# Patient Record
Sex: FEMALE | Race: WHITE
[De-identification: names, ages, dates, MRNs, and addresses within clinical notes are randomized per-mention and may not be internally consistent; named-entity substitution may affect disease eponyms.]

---

## 2017-02-10 ENCOUNTER — APPOINTMENT (OUTPATIENT)
Dept: OBGYN | Facility: CLINIC | Age: 67
End: 2017-02-10

## 2017-02-10 VITALS — DIASTOLIC BLOOD PRESSURE: 80 MMHG | SYSTOLIC BLOOD PRESSURE: 130 MMHG | HEIGHT: 61 IN

## 2017-02-10 DIAGNOSIS — Z80.3 FAMILY HISTORY OF MALIGNANT NEOPLASM OF BREAST: ICD-10-CM

## 2017-02-10 DIAGNOSIS — Z12.4 ENCOUNTER FOR SCREENING FOR MALIGNANT NEOPLASM OF CERVIX: ICD-10-CM

## 2017-02-10 DIAGNOSIS — Z78.9 OTHER SPECIFIED HEALTH STATUS: ICD-10-CM

## 2017-02-10 DIAGNOSIS — Z92.89 PERSONAL HISTORY OF OTHER MEDICAL TREATMENT: ICD-10-CM

## 2017-02-16 LAB
CYTOLOGY CVX/VAG DOC THIN PREP: NORMAL
HPV HIGH+LOW RISK DNA PNL CVX: POSITIVE

## 2017-03-06 DIAGNOSIS — R92.8 OTHER ABNORMAL AND INCONCLUSIVE FINDINGS ON DIAGNOSTIC IMAGING OF BREAST: ICD-10-CM

## 2017-06-05 ENCOUNTER — OTHER (OUTPATIENT)
Age: 67
End: 2017-06-05

## 2018-02-16 ENCOUNTER — APPOINTMENT (OUTPATIENT)
Dept: OBGYN | Facility: CLINIC | Age: 68
End: 2018-02-16
Payer: MEDICARE

## 2018-02-16 VITALS
HEIGHT: 61 IN | BODY MASS INDEX: 24.35 KG/M2 | DIASTOLIC BLOOD PRESSURE: 80 MMHG | WEIGHT: 129 LBS | SYSTOLIC BLOOD PRESSURE: 110 MMHG

## 2018-02-16 DIAGNOSIS — Z86.19 PERSONAL HISTORY OF OTHER INFECTIOUS AND PARASITIC DISEASES: ICD-10-CM

## 2018-02-16 DIAGNOSIS — B97.7 PAPILLOMAVIRUS AS THE CAUSE OF DISEASES CLASSIFIED ELSEWHERE: ICD-10-CM

## 2018-02-16 PROCEDURE — 99214 OFFICE O/P EST MOD 30 MIN: CPT

## 2018-02-16 RX ORDER — AMOXICILLIN 500 MG/1
500 CAPSULE ORAL
Qty: 30 | Refills: 0 | Status: COMPLETED | COMMUNITY
Start: 2016-11-04

## 2018-02-22 LAB
CYTOLOGY CVX/VAG DOC THIN PREP: NORMAL
HPV HIGH+LOW RISK DNA PNL CVX: NOT DETECTED

## 2019-05-10 ENCOUNTER — APPOINTMENT (OUTPATIENT)
Dept: OBGYN | Facility: CLINIC | Age: 69
End: 2019-05-10
Payer: MEDICARE

## 2019-05-10 VITALS — SYSTOLIC BLOOD PRESSURE: 121 MMHG | DIASTOLIC BLOOD PRESSURE: 80 MMHG

## 2019-05-10 PROCEDURE — G0101: CPT

## 2019-05-10 NOTE — HISTORY OF PRESENT ILLNESS
[1 Year Ago] : 1 year ago [Good] : being in good health [Regular Exercise] : regular exercise [Healthy Diet] : a healthy diet [Last Mammogram ___] : Last Mammogram was [unfilled] [Last Pap ___] : Last cervical pap smear was [unfilled] [Postmenopausal] : is postmenopausal [Last Bone Density ___] : Last bone density studies [unfilled] [Last Colonoscopy ___] : Last colonoscopy [unfilled] [Sexually Active] : is sexually active

## 2019-05-10 NOTE — COUNSELING
allergic rx to chinese rice [Breast Self Exam] : breast self exam [Nutrition] : nutrition [Vitamins/Supplements] : vitamins/supplements [Exercise] : exercise

## 2019-05-10 NOTE — CHIEF COMPLAINT
[Annual Visit] : annual visit [FreeTextEntry1] : Finishing up book tour, no vb, taking vit d\par had normal pap hpv+ 2 yrs ago, last yr nml hpv-

## 2019-05-10 NOTE — PHYSICAL EXAM
[Awake] : awake [Alert] : alert [LAD] : no lymphadenopathy [Acute Distress] : no acute distress [Thyroid Nodule] : no thyroid nodule [Goiter] : no goiter [Mass] : no breast mass [Nipple Discharge] : no nipple discharge [Axillary LAD] : no axillary lymphadenopathy [Soft] : soft [Tender] : non tender [Distended] : not distended [H/Smegaly] : no hepatosplenomegaly [Oriented x3] : oriented to person, place, and time [Depressed Mood] : not depressed [Flat Affect] : affect not flat [Vulvar Atrophy] : vulvar atrophy [Normal] : uterus [Pap Obtained] : a Pap smear was performed [No Bleeding] : there was no active vaginal bleeding [Atrophy] : atrophy [Normal Position] : in a normal position [Tenderness] : nontender [Enlarged ___ wks] : not enlarged [Uterine Adnexae] : were not tender and not enlarged [Adnexa Tenderness] : were not tender [Mass ___ cm] : no uterine mass was palpated [Ovarian Mass (___ Cm)] : there were no adnexal masses [RRR, No Murmurs] : RRR, no murmurs [Occult Blood] : occult blood test from digital rectal exam was negative [No Tenderness] : no rectal tenderness [CTAB] : CTAB

## 2019-05-13 LAB — HPV HIGH+LOW RISK DNA PNL CVX: NOT DETECTED

## 2019-05-16 LAB — CYTOLOGY CVX/VAG DOC THIN PREP: NORMAL

## 2020-09-11 ENCOUNTER — APPOINTMENT (OUTPATIENT)
Dept: OBGYN | Facility: CLINIC | Age: 70
End: 2020-09-11
Payer: MEDICARE

## 2020-09-11 VITALS
HEIGHT: 61 IN | WEIGHT: 132 LBS | SYSTOLIC BLOOD PRESSURE: 140 MMHG | DIASTOLIC BLOOD PRESSURE: 90 MMHG | BODY MASS INDEX: 24.92 KG/M2 | TEMPERATURE: 97.7 F

## 2020-09-11 PROCEDURE — G0101: CPT

## 2020-09-11 NOTE — CHIEF COMPLAINT
[Annual Visit] : annual visit [FreeTextEntry1] : 71 yo G0 with LMP 50 for annual exam. Patient of Dr Kidd for this year's exam.

## 2020-09-16 LAB — HPV HIGH+LOW RISK DNA PNL CVX: NOT DETECTED

## 2021-09-02 DIAGNOSIS — Z98.890 OTHER SPECIFIED POSTPROCEDURAL STATES: ICD-10-CM

## 2021-09-10 PROBLEM — Z98.890 HISTORY OF COLONOSCOPY: Status: RESOLVED | Noted: 2017-02-10 | Resolved: 2021-09-10

## 2022-03-04 ENCOUNTER — APPOINTMENT (OUTPATIENT)
Dept: OBGYN | Facility: CLINIC | Age: 72
End: 2022-03-04
Payer: MEDICARE

## 2022-03-04 VITALS
HEIGHT: 61 IN | DIASTOLIC BLOOD PRESSURE: 80 MMHG | WEIGHT: 140 LBS | TEMPERATURE: 98 F | SYSTOLIC BLOOD PRESSURE: 130 MMHG | BODY MASS INDEX: 26.43 KG/M2

## 2022-03-04 PROCEDURE — G0101: CPT

## 2022-03-04 NOTE — DISCUSSION/SUMMARY
[FreeTextEntry1] : I explained that I cant  her on safety or efficacy of above supplements because of limited studies. She understands.

## 2022-03-04 NOTE — HISTORY OF PRESENT ILLNESS
[No] : Patient does not have concerns regarding sex [Currently Active] : currently active [Men] : men [Vaginal] : vaginal [TextBox_4] : requests pap again\par doesn’t get bmd's because she doesn’t want to take medicine\par takes vit d and exercises\par taking DHEA, pregnanalone and progesterone cream,  is a  and into nutritional supplements. [Mammogramdate] : 9/2020 [PapSmeardate] : 9/2020 [BoneDensityDate] : declines [ColonoscopyDate] : 9/2021 [TextBox_43] : fu 3 yrs

## 2022-03-04 NOTE — PHYSICAL EXAM
[Appropriately responsive] : appropriately responsive [Alert] : alert [No Acute Distress] : no acute distress [Soft] : soft [Non-tender] : non-tender [Non-distended] : non-distended [No HSM] : No HSM [No Lesions] : no lesions [No Mass] : no mass [Oriented x3] : oriented x3 [Examination Of The Breasts] : a normal appearance [No Masses] : no breast masses were palpable [Vulvar Atrophy] : vulvar atrophy [Labia Majora] : normal [Labia Minora] : normal [Atrophy] : atrophy [Normal] : normal [Tenderness] : nontender [Enlarged ___ wks] : not enlarged [Mass ___ cm] : no uterine mass was palpated [Uterine Adnexae] : non-palpable [FreeTextEntry5] : pap done [FreeTextEntry9] : deferred secondary to recent colonoscopy

## 2022-03-06 LAB — HPV HIGH+LOW RISK DNA PNL CVX: NOT DETECTED

## 2022-07-20 ENCOUNTER — FORM ENCOUNTER (OUTPATIENT)
Age: 72
End: 2022-07-20

## 2022-07-22 ENCOUNTER — APPOINTMENT (OUTPATIENT)
Dept: FAMILY MEDICINE | Facility: CLINIC | Age: 72
End: 2022-07-22

## 2022-07-22 VITALS
DIASTOLIC BLOOD PRESSURE: 88 MMHG | SYSTOLIC BLOOD PRESSURE: 142 MMHG | HEIGHT: 61 IN | TEMPERATURE: 97.3 F | BODY MASS INDEX: 25.68 KG/M2 | OXYGEN SATURATION: 98 % | HEART RATE: 90 BPM | WEIGHT: 136 LBS

## 2022-07-22 VITALS — DIASTOLIC BLOOD PRESSURE: 84 MMHG | SYSTOLIC BLOOD PRESSURE: 130 MMHG

## 2022-07-22 PROCEDURE — G0439: CPT

## 2022-07-22 NOTE — PHYSICAL EXAM
[No Acute Distress] : no acute distress [Well Nourished] : well nourished [Well-Appearing] : well-appearing [Well Developed] : well developed [Normal Sclera/Conjunctiva] : normal sclera/conjunctiva [PERRL] : pupils equal round and reactive to light [EOMI] : extraocular movements intact [Normal Outer Ear/Nose] : the outer ears and nose were normal in appearance [Normal Oropharynx] : the oropharynx was normal [No JVD] : no jugular venous distention [No Lymphadenopathy] : no lymphadenopathy [Supple] : supple [Thyroid Normal, No Nodules] : the thyroid was normal and there were no nodules present [No Respiratory Distress] : no respiratory distress  [No Accessory Muscle Use] : no accessory muscle use [Clear to Auscultation] : lungs were clear to auscultation bilaterally [Normal Rate] : normal rate  [Regular Rhythm] : with a regular rhythm [Normal S1, S2] : normal S1 and S2 [No Murmur] : no murmur heard [No Carotid Bruits] : no carotid bruits [No Abdominal Bruit] : a ~M bruit was not heard ~T in the abdomen [No Varicosities] : no varicosities [Pedal Pulses Present] : the pedal pulses are present [No Edema] : there was no peripheral edema [No Palpable Aorta] : no palpable aorta [No Extremity Clubbing/Cyanosis] : no extremity clubbing/cyanosis [Soft] : abdomen soft [Non Tender] : non-tender [Non-distended] : non-distended [No Masses] : no abdominal mass palpated [No HSM] : no HSM [Normal Bowel Sounds] : normal bowel sounds [Normal Posterior Cervical Nodes] : no posterior cervical lymphadenopathy [Normal Anterior Cervical Nodes] : no anterior cervical lymphadenopathy [No CVA Tenderness] : no CVA  tenderness [No Spinal Tenderness] : no spinal tenderness [No Joint Swelling] : no joint swelling [Grossly Normal Strength/Tone] : grossly normal strength/tone [No Rash] : no rash [Coordination Grossly Intact] : coordination grossly intact [No Focal Deficits] : no focal deficits [Normal Gait] : normal gait [Normal Affect] : the affect was normal [Deep Tendon Reflexes (DTR)] : deep tendon reflexes were 2+ and symmetric [Normal Insight/Judgement] : insight and judgment were intact

## 2022-07-22 NOTE — PLAN
[FreeTextEntry1] : Reviewed age-appropriate preventative screening tests with patient. She is due for DEXA and several vaccines (Shingrix, Tdap, Pneumococcal). She declines all of these.\par \par Discussed clean eating (e.g. Mediterranean style diet) and recommendations for regular exercise/ staying as physically active as possible.\par \par Reviewed importance of good self care (e.g. meditation, yoga, adequate rest, regular exercise, magnesium, clean eating, etc.).\par \par Follow up for next physical in one year.\par

## 2022-07-22 NOTE — HISTORY OF PRESENT ILLNESS
[FreeTextEntry1] : HARISH QUINONES is a 72 year old female here for a physical exam.  [de-identified] : Her last physical exam was 11/13/2020\par \par Vaccines:\par Tetanus is NOT up to date\par Pneumococcal vaccine is NOT up to date\par Shingrix is NOT up to date\par COVID vaccine is NOT up to date \par \par Her last dentist visit was less than one year ago\par Her last eye doctor appointment was less than one year ago\par Her last dermatologist visit was a couple years ago\par \par GYN visit is up to date\par Mammogram is up to date\par Colon cancer screening is up to date\par DEXA is NOT up to date\par \par Her diet is healthy overall\par Exercise: treadmill

## 2022-07-22 NOTE — ASSESSMENT
[FreeTextEntry1] : HARISH QUINONES is a 72 year old female here for a physical exam. \par \par She has a history of white coat hypertension.\par \par She sees a cardiologist regularly and does not need an EKG today. Her cardiologist also ordered labs so she will not have blood work done today.

## 2023-01-28 ENCOUNTER — OFFICE (OUTPATIENT)
Dept: URBAN - METROPOLITAN AREA CLINIC 94 | Facility: CLINIC | Age: 73
Setting detail: OPHTHALMOLOGY
End: 2023-01-28
Payer: MEDICARE

## 2023-01-28 DIAGNOSIS — H43.813: ICD-10-CM

## 2023-01-28 DIAGNOSIS — H35.371: ICD-10-CM

## 2023-01-28 DIAGNOSIS — H35.411: ICD-10-CM

## 2023-01-28 DIAGNOSIS — D31.32: ICD-10-CM

## 2023-01-28 DIAGNOSIS — H35.3132: ICD-10-CM

## 2023-01-28 PROBLEM — H43.392 VITREOUS FLOATERS; RIGHT EYE, LEFT EYE: Status: ACTIVE | Noted: 2023-01-28

## 2023-01-28 PROBLEM — H43.391 VITREOUS FLOATERS; RIGHT EYE, LEFT EYE: Status: ACTIVE | Noted: 2023-01-28

## 2023-01-28 PROCEDURE — 92014 COMPRE OPH EXAM EST PT 1/>: CPT | Performed by: OPHTHALMOLOGY

## 2023-01-28 PROCEDURE — 92134 CPTRZ OPH DX IMG PST SGM RTA: CPT | Performed by: OPHTHALMOLOGY

## 2023-01-28 PROCEDURE — 92235 FLUORESCEIN ANGRPH MLTIFRAME: CPT | Performed by: OPHTHALMOLOGY

## 2023-01-28 ASSESSMENT — LID EXAM ASSESSMENTS
OD_BLEPHARITIS: 2+
OD_LEVATOR_FUNCTION: 20 MM
OD_MRD1: 1.5 MM
OS_MRD1: 1.5 MM
OS_LEVATOR_FUNCTION: 20 MM
OD_FRONTALIS_USE: 1+
OS_BLEPHARITIS: 2+

## 2023-01-28 ASSESSMENT — REFRACTION_AUTOREFRACTION
OD_SPHERE: -2.25
OS_AXIS: 5
OD_AXIS: 1
OS_SPHERE: -2.50
OD_CYLINDER: -4.75
OS_CYLINDER: -3.50

## 2023-01-28 ASSESSMENT — CONFRONTATIONAL VISUAL FIELD TEST (CVF)
OS_FINDINGS: FULL
OD_FINDINGS: FULL

## 2023-01-28 ASSESSMENT — KERATOMETRY
OS_K1POWER_DIOPTERS: 44.50
OD_K1POWER_DIOPTERS: 44.00
OS_AXISANGLE_DEGREES: 99
OD_AXISANGLE_DEGREES: 87
OS_K2POWER_DIOPTERS: 47.25
METHOD_AUTO_MANUAL: AUTO
OD_K2POWER_DIOPTERS: 48.25

## 2023-01-28 ASSESSMENT — SPHEQUIV_DERIVED
OS_SPHEQUIV: -4.25
OD_SPHEQUIV: -4.625

## 2023-01-28 ASSESSMENT — LID POSITION - DERMATOCHALASIS
OS_DERMATOCHALASIS: T
OD_DERMATOCHALASIS: T

## 2023-01-28 ASSESSMENT — LID POSITION - PTOSIS
OD_PTOSIS: RUL
OS_PTOSIS: LUL

## 2023-01-28 ASSESSMENT — AXIALLENGTH_DERIVED
OS_AL: 24.4023
OD_AL: 24.4605

## 2023-01-28 ASSESSMENT — VISUAL ACUITY
OD_BCVA: 20/25
OS_BCVA: 20/25

## 2023-01-28 ASSESSMENT — TONOMETRY
OS_IOP_MMHG: 18
OD_IOP_MMHG: 17

## 2023-03-17 DIAGNOSIS — R73.09 OTHER ABNORMAL GLUCOSE: ICD-10-CM

## 2023-03-21 ENCOUNTER — TRANSCRIPTION ENCOUNTER (OUTPATIENT)
Age: 73
End: 2023-03-21

## 2023-03-22 ENCOUNTER — NON-APPOINTMENT (OUTPATIENT)
Age: 73
End: 2023-03-22

## 2023-03-24 ENCOUNTER — APPOINTMENT (OUTPATIENT)
Dept: FAMILY MEDICINE | Facility: CLINIC | Age: 73
End: 2023-03-24
Payer: MEDICARE

## 2023-03-24 PROCEDURE — 36415 COLL VENOUS BLD VENIPUNCTURE: CPT

## 2023-03-25 LAB
25(OH)D3 SERPL-MCNC: 110 NG/ML
ALBUMIN SERPL ELPH-MCNC: 4.6 G/DL
ALP BLD-CCNC: 83 U/L
ALT SERPL-CCNC: 20 U/L
ANION GAP SERPL CALC-SCNC: 11 MMOL/L
AST SERPL-CCNC: 18 U/L
BASOPHILS # BLD AUTO: 0.08 K/UL
BASOPHILS NFR BLD AUTO: 0.9 %
BILIRUB SERPL-MCNC: 0.3 MG/DL
BUN SERPL-MCNC: 17 MG/DL
CALCIUM SERPL-MCNC: 9.7 MG/DL
CHLORIDE SERPL-SCNC: 106 MMOL/L
CHOLEST SERPL-MCNC: 206 MG/DL
CO2 SERPL-SCNC: 25 MMOL/L
COVID-19 SPIKE DOMAIN ANTIBODY INTERPRETATION: NEGATIVE
CREAT SERPL-MCNC: 0.99 MG/DL
EGFR: 61 ML/MIN/1.73M2
EOSINOPHIL # BLD AUTO: 0.14 K/UL
EOSINOPHIL NFR BLD AUTO: 1.5 %
ESTIMATED AVERAGE GLUCOSE: 117 MG/DL
GLUCOSE SERPL-MCNC: 102 MG/DL
HBA1C MFR BLD HPLC: 5.7 %
HCT VFR BLD CALC: 45.7 %
HDLC SERPL-MCNC: 64 MG/DL
HGB BLD-MCNC: 14.4 G/DL
IMM GRANULOCYTES NFR BLD AUTO: 0.4 %
LDLC SERPL CALC-MCNC: 126 MG/DL
LYMPHOCYTES # BLD AUTO: 1.89 K/UL
LYMPHOCYTES NFR BLD AUTO: 20.1 %
MAN DIFF?: NORMAL
MCHC RBC-ENTMCNC: 31.2 PG
MCHC RBC-ENTMCNC: 31.5 GM/DL
MCV RBC AUTO: 99.1 FL
MONOCYTES # BLD AUTO: 0.55 K/UL
MONOCYTES NFR BLD AUTO: 5.9 %
NEUTROPHILS # BLD AUTO: 6.68 K/UL
NEUTROPHILS NFR BLD AUTO: 71.2 %
NONHDLC SERPL-MCNC: 142 MG/DL
PLATELET # BLD AUTO: 278 K/UL
POTASSIUM SERPL-SCNC: 4.1 MMOL/L
PROT SERPL-MCNC: 6.9 G/DL
RBC # BLD: 4.61 M/UL
RBC # FLD: 13.2 %
SARS-COV-2 AB SERPL IA-ACNC: 0.4 U/ML
SODIUM SERPL-SCNC: 143 MMOL/L
TRIGL SERPL-MCNC: 77 MG/DL
TSH SERPL-ACNC: 4.14 UIU/ML
WBC # FLD AUTO: 9.38 K/UL

## 2023-03-27 LAB
GLIADIN IGA SER QL: <5 UNITS
GLIADIN IGG SER QL: <5 UNITS
GLIADIN PEPTIDE IGA SER-ACNC: NEGATIVE
GLIADIN PEPTIDE IGG SER-ACNC: NEGATIVE
TTG IGA SER IA-ACNC: <1.2 U/ML
TTG IGA SER-ACNC: NEGATIVE
TTG IGG SER IA-ACNC: 2 U/ML
TTG IGG SER IA-ACNC: NEGATIVE

## 2023-03-28 ENCOUNTER — NON-APPOINTMENT (OUTPATIENT)
Age: 73
End: 2023-03-28

## 2023-03-29 LAB
ENDOMYSIUM IGA SER QL: NEGATIVE
ENDOMYSIUM IGA TITR SER: NORMAL

## 2023-04-15 ENCOUNTER — OFFICE (OUTPATIENT)
Dept: URBAN - METROPOLITAN AREA CLINIC 12 | Facility: CLINIC | Age: 73
Setting detail: OPHTHALMOLOGY
End: 2023-04-15
Payer: MEDICARE

## 2023-04-15 DIAGNOSIS — H35.371: ICD-10-CM

## 2023-04-15 DIAGNOSIS — H43.392: ICD-10-CM

## 2023-04-15 DIAGNOSIS — D31.32: ICD-10-CM

## 2023-04-15 DIAGNOSIS — H16.223: ICD-10-CM

## 2023-04-15 DIAGNOSIS — H35.3132: ICD-10-CM

## 2023-04-15 DIAGNOSIS — H43.391: ICD-10-CM

## 2023-04-15 DIAGNOSIS — H43.813: ICD-10-CM

## 2023-04-15 DIAGNOSIS — H35.411: ICD-10-CM

## 2023-04-15 DIAGNOSIS — H25.13: ICD-10-CM

## 2023-04-15 PROCEDURE — 99214 OFFICE O/P EST MOD 30 MIN: CPT | Performed by: OPHTHALMOLOGY

## 2023-04-15 PROCEDURE — 92250 FUNDUS PHOTOGRAPHY W/I&R: CPT | Performed by: OPHTHALMOLOGY

## 2023-04-15 ASSESSMENT — AXIALLENGTH_DERIVED
OS_AL: 24.4605
OS_AL: 24.4605
OD_AL: 24.4575
OD_AL: 24.4575

## 2023-04-15 ASSESSMENT — REFRACTION_CURRENTRX
OS_VPRISM_DIRECTION: PROGS
OS_SPHERE: -2.50
OS_CYLINDER: +3.50
OD_SPHERE: -2.75
OD_ADD: +3.00
OS_OVR_VA: 20/
OS_AXIS: 005
OD_CYLINDER: -4.50
OS_ADD: +3.00
OD_VPRISM_DIRECTION: PROGS
OD_AXIS: 168
OD_OVR_VA: 20/

## 2023-04-15 ASSESSMENT — TONOMETRY
OS_IOP_MMHG: 18
OD_IOP_MMHG: 17

## 2023-04-15 ASSESSMENT — KERATOMETRY
METHOD_AUTO_MANUAL: AUTO
OS_K2POWER_DIOPTERS: 47.75
OD_K2POWER_DIOPTERS: 48.00
OD_K1POWER_DIOPTERS: 44.00
OS_K1POWER_DIOPTERS: 44.50
OS_AXISANGLE_DEGREES: 094
OD_AXISANGLE_DEGREES: 082

## 2023-04-15 ASSESSMENT — REFRACTION_AUTOREFRACTION
OD_CYLINDER: -4.50
OD_SPHERE: -2.25
OD_AXIS: 177
OS_CYLINDER: -4.25
OS_SPHERE: -2.50
OS_AXIS: 178

## 2023-04-15 ASSESSMENT — LID POSITION - PTOSIS
OD_PTOSIS: RUL
OS_PTOSIS: LUL

## 2023-04-15 ASSESSMENT — LID EXAM ASSESSMENTS
OD_LEVATOR_FUNCTION: 20 MM
OD_MRD1: 1.5 MM
OD_BLEPHARITIS: 2+
OD_FRONTALIS_USE: 1+
OS_MRD1: 1.5 MM
OS_LEVATOR_FUNCTION: 20 MM
OS_BLEPHARITIS: 2+

## 2023-04-15 ASSESSMENT — REFRACTION_MANIFEST
OS_SPHERE: -2.50
OS_CYLINDER: -4.25
OS_AXIS: 180
OD_AXIS: 180
OD_SPHERE: -2.25
OD_CYLINDER: -4.50

## 2023-04-15 ASSESSMENT — CONFRONTATIONAL VISUAL FIELD TEST (CVF)
OD_FINDINGS: FULL
OS_FINDINGS: FULL

## 2023-04-15 ASSESSMENT — SPHEQUIV_DERIVED
OD_SPHEQUIV: -4.5
OS_SPHEQUIV: -4.625
OD_SPHEQUIV: -4.5
OS_SPHEQUIV: -4.625

## 2023-04-15 ASSESSMENT — LID POSITION - DERMATOCHALASIS
OS_DERMATOCHALASIS: T
OD_DERMATOCHALASIS: T

## 2023-04-15 ASSESSMENT — VISUAL ACUITY
OD_BCVA: 20/25+2
OS_BCVA: 20/30+1

## 2023-04-21 ENCOUNTER — NON-APPOINTMENT (OUTPATIENT)
Age: 73
End: 2023-04-21

## 2023-04-22 ENCOUNTER — APPOINTMENT (OUTPATIENT)
Dept: OBGYN | Facility: CLINIC | Age: 73
End: 2023-04-22
Payer: MEDICARE

## 2023-04-22 VITALS
BODY MASS INDEX: 23.98 KG/M2 | HEIGHT: 61 IN | DIASTOLIC BLOOD PRESSURE: 80 MMHG | SYSTOLIC BLOOD PRESSURE: 130 MMHG | WEIGHT: 127 LBS

## 2023-04-22 DIAGNOSIS — Z01.419 ENCOUNTER FOR GYNECOLOGICAL EXAMINATION (GENERAL) (ROUTINE) W/OUT ABNORMAL FINDINGS: ICD-10-CM

## 2023-04-22 DIAGNOSIS — Z12.31 ENCOUNTER FOR SCREENING MAMMOGRAM FOR MALIGNANT NEOPLASM OF BREAST: ICD-10-CM

## 2023-04-22 PROCEDURE — 99214 OFFICE O/P EST MOD 30 MIN: CPT | Mod: 25

## 2023-04-22 RX ORDER — BLOOD SUGAR DIAGNOSTIC
100 STRIP MISCELLANEOUS
Refills: 0 | Status: ACTIVE | COMMUNITY

## 2023-04-22 RX ORDER — UBIDECARENONE/VIT E ACET 100MG-5
CAPSULE ORAL
Refills: 0 | Status: ACTIVE | COMMUNITY

## 2023-04-22 RX ORDER — MULTIVITAMIN,THER AND MINERALS
TABLET ORAL
Refills: 0 | Status: ACTIVE | COMMUNITY

## 2023-04-22 RX ORDER — AMOXICILLIN 500 MG/1
500 TABLET, FILM COATED ORAL
Qty: 20 | Refills: 3 | Status: DISCONTINUED | COMMUNITY
Start: 2022-07-22 | End: 2023-04-22

## 2023-04-22 RX ORDER — ACETAMINOPHEN 500 MG
TABLET ORAL
Refills: 0 | Status: ACTIVE | COMMUNITY

## 2023-04-22 RX ORDER — ZINC SULFATE 50(220)MG
CAPSULE ORAL
Refills: 0 | Status: ACTIVE | COMMUNITY

## 2023-04-22 NOTE — HISTORY OF PRESENT ILLNESS
[No] : Patient does not have concerns regarding sex [Currently Active] : currently active [Men] : men [Vaginal] : vaginal [TextBox_4] : requests pap again\par doesn’t get bmd's because she doesn’t want to take medicine\par takes vit d and exercises\par taking DHEA, pregnanalone and progesterone cream,  is a  and into nutritional supplements.\par digestive problems and stomach gets big, did a cleanse and feels better but want to make sure nothing else is going on. [Mammogramdate] : 3/2022 [PapSmeardate] : 3/2022 [ColonoscopyDate] : 9/2021 [BoneDensityDate] : declines [TextBox_43] : fu 3 yrs

## 2023-04-22 NOTE — PHYSICAL EXAM
[Appropriately responsive] : appropriately responsive [Alert] : alert [No Acute Distress] : no acute distress [Soft] : soft [Non-tender] : non-tender [Non-distended] : non-distended [No HSM] : No HSM [No Lesions] : no lesions [No Mass] : no mass [Oriented x3] : oriented x3 [Examination Of The Breasts] : a normal appearance [No Masses] : no breast masses were palpable [Labia Majora] : normal [Vulvar Atrophy] : vulvar atrophy [Labia Minora] : normal [Atrophy] : atrophy [Normal] : normal [Uterine Adnexae] : non-palpable [No Tenderness] : no tenderness [Tenderness] : nontender [Enlarged ___ wks] : not enlarged [Mass ___ cm] : no uterine mass was palpated [FreeTextEntry5] : pap done [FreeTextEntry9] : guaiac-

## 2023-04-24 ENCOUNTER — APPOINTMENT (OUTPATIENT)
Dept: FAMILY MEDICINE | Facility: CLINIC | Age: 73
End: 2023-04-24
Payer: MEDICARE

## 2023-04-24 VITALS
SYSTOLIC BLOOD PRESSURE: 158 MMHG | HEART RATE: 91 BPM | TEMPERATURE: 96 F | DIASTOLIC BLOOD PRESSURE: 90 MMHG | HEIGHT: 61 IN | WEIGHT: 127 LBS | BODY MASS INDEX: 23.98 KG/M2 | OXYGEN SATURATION: 98 %

## 2023-04-24 VITALS — DIASTOLIC BLOOD PRESSURE: 90 MMHG | SYSTOLIC BLOOD PRESSURE: 142 MMHG

## 2023-04-24 DIAGNOSIS — H25.89 OTHER AGE-RELATED CATARACT: ICD-10-CM

## 2023-04-24 DIAGNOSIS — Z01.818 ENCOUNTER FOR OTHER PREPROCEDURAL EXAMINATION: ICD-10-CM

## 2023-04-24 LAB — HPV HIGH+LOW RISK DNA PNL CVX: NOT DETECTED

## 2023-04-24 PROCEDURE — 99213 OFFICE O/P EST LOW 20 MIN: CPT

## 2023-04-24 NOTE — ADDENDUM
[FreeTextEntry1] : Agree with above. Patient is medically optimized for the proposed procedure.\par Arjun Manzano M.D.\par

## 2023-04-24 NOTE — ASSESSMENT
[FreeTextEntry4] : Patient is a 74yo female with PMH HTN, prediabetes who presents to the office for presurgical clearance. Patient is scheduled for cataract repair with Dr. Sutton, right eye on 05/08/2023, left eye on 05/22/2023 at [facility]. \par \par Initial BP elevated, repeat improved.\par BP 04/22/2023 130/80 at GYN.\par \par Pt optimized for proposed procedure.

## 2023-04-24 NOTE — HISTORY OF PRESENT ILLNESS
[No Pertinent Cardiac History] : no history of aortic stenosis, atrial fibrillation, coronary artery disease, recent myocardial infarction, or implantable device/pacemaker [No Pertinent Pulmonary History] : no history of asthma, COPD, sleep apnea, or smoking [No Adverse Anesthesia Reaction] : no adverse anesthesia reaction in self or family member [(Patient denies any chest pain, claudication, dyspnea on exertion, orthopnea, palpitations or syncope)] : Patient denies any chest pain, claudication, dyspnea on exertion, orthopnea, palpitations or syncope [Chronic Anticoagulation] : no chronic anticoagulation [Chronic Kidney Disease] : no chronic kidney disease [Diabetes] : no diabetes [FreeTextEntry2] : Right eye 05/08/2023, Left eye 05/22/2023 [FreeTextEntry1] : Cataract repair [FreeTextEntry3] : Dr. Sutton [FreeTextEntry4] : Patient is a 72yo female with PMH HTN, prediabetes who presents to the office for presurgical clearance.  Patient is scheduled for cataract repair with Dr. Sutton, right eye on 05/08/2023, left eye on 05/22/2023 at [facility].  Pt does not require PST.

## 2023-04-25 LAB — CYTOLOGY CVX/VAG DOC THIN PREP: NORMAL

## 2023-04-28 ENCOUNTER — FORM ENCOUNTER (OUTPATIENT)
Age: 73
End: 2023-04-28

## 2023-04-28 ENCOUNTER — OFFICE (OUTPATIENT)
Dept: URBAN - METROPOLITAN AREA CLINIC 12 | Facility: CLINIC | Age: 73
Setting detail: OPHTHALMOLOGY
End: 2023-04-28
Payer: MEDICARE

## 2023-04-28 DIAGNOSIS — H25.11: ICD-10-CM

## 2023-04-28 DIAGNOSIS — H25.13: ICD-10-CM

## 2023-04-28 PROCEDURE — 99213 OFFICE O/P EST LOW 20 MIN: CPT | Performed by: OPHTHALMOLOGY

## 2023-04-28 PROCEDURE — 92136 OPHTHALMIC BIOMETRY: CPT | Performed by: OPHTHALMOLOGY

## 2023-04-28 ASSESSMENT — KERATOMETRY
OS_K2POWER_DIOPTERS: 47.75
OS_AXISANGLE_DEGREES: 091
OD_AXISANGLE_DEGREES: 087
OS_K1POWER_DIOPTERS: 44.25
OD_K2POWER_DIOPTERS: 48.25
OD_K1POWER_DIOPTERS: 44.00
METHOD_AUTO_MANUAL: AUTO

## 2023-04-28 ASSESSMENT — REFRACTION_CURRENTRX
OS_SPHERE: -2.50
OS_VPRISM_DIRECTION: PROGS
OS_OVR_VA: 20/
OS_CYLINDER: +3.50
OS_AXIS: 005
OD_AXIS: 168
OD_SPHERE: -2.75
OD_ADD: +3.00
OS_ADD: +3.00
OD_VPRISM_DIRECTION: PROGS
OD_CYLINDER: -4.50
OD_OVR_VA: 20/

## 2023-04-28 ASSESSMENT — REFRACTION_AUTOREFRACTION
OS_AXIS: 180
OD_CYLINDER: -4.50
OS_SPHERE: -2.50
OS_CYLINDER: -4.25
OD_AXIS: 178
OD_SPHERE: -2.25

## 2023-04-28 ASSESSMENT — REFRACTION_MANIFEST
OS_CYLINDER: -4.25
OS_SPHERE: -2.50
OS_AXIS: 180
OD_CYLINDER: -4.50
OD_AXIS: 180
OD_SPHERE: -2.25

## 2023-04-28 ASSESSMENT — LID POSITION - PTOSIS
OS_PTOSIS: LUL
OD_PTOSIS: RUL

## 2023-04-28 ASSESSMENT — LID POSITION - DERMATOCHALASIS
OD_DERMATOCHALASIS: T
OS_DERMATOCHALASIS: T

## 2023-04-28 ASSESSMENT — SPHEQUIV_DERIVED
OS_SPHEQUIV: -4.625
OD_SPHEQUIV: -4.5
OD_SPHEQUIV: -4.5
OS_SPHEQUIV: -4.625

## 2023-04-28 ASSESSMENT — LID EXAM ASSESSMENTS
OS_LEVATOR_FUNCTION: 20 MM
OD_FRONTALIS_USE: 1+
OS_BLEPHARITIS: 2+
OS_MRD1: 1.5 MM
OD_LEVATOR_FUNCTION: 20 MM
OD_BLEPHARITIS: 2+
OD_MRD1: 1.5 MM

## 2023-04-28 ASSESSMENT — CONFRONTATIONAL VISUAL FIELD TEST (CVF)
OS_FINDINGS: FULL
OD_FINDINGS: FULL

## 2023-04-28 ASSESSMENT — AXIALLENGTH_DERIVED
OD_AL: 24.4082
OD_AL: 24.4082
OS_AL: 24.5099
OS_AL: 24.5099

## 2023-04-28 ASSESSMENT — TONOMETRY
OD_IOP_MMHG: 15
OS_IOP_MMHG: 14

## 2023-04-28 ASSESSMENT — VISUAL ACUITY
OS_BCVA: 20/60
OD_BCVA: 20/40

## 2023-05-08 ENCOUNTER — FORM ENCOUNTER (OUTPATIENT)
Age: 73
End: 2023-05-08

## 2023-05-08 ENCOUNTER — ASC (OUTPATIENT)
Dept: URBAN - METROPOLITAN AREA SURGERY 8 | Facility: SURGERY | Age: 73
Setting detail: OPHTHALMOLOGY
End: 2023-05-08
Payer: MEDICARE

## 2023-05-08 DIAGNOSIS — H52.211: ICD-10-CM

## 2023-05-08 DIAGNOSIS — H25.11: ICD-10-CM

## 2023-05-08 PROCEDURE — FEMTO PRECISION LASER CATARACT SURGERY: Performed by: OPHTHALMOLOGY

## 2023-05-08 PROCEDURE — A9270 NON-COVERED ITEM OR SERVICE: HCPCS | Performed by: OPHTHALMOLOGY

## 2023-05-08 PROCEDURE — V2787 ASTIGMATISM-CORRECT FUNCTION: HCPCS | Performed by: OPHTHALMOLOGY

## 2023-05-08 PROCEDURE — 66984 XCAPSL CTRC RMVL W/O ECP: CPT | Performed by: OPHTHALMOLOGY

## 2023-05-09 ENCOUNTER — OFFICE (OUTPATIENT)
Dept: URBAN - METROPOLITAN AREA CLINIC 12 | Facility: CLINIC | Age: 73
Setting detail: OPHTHALMOLOGY
End: 2023-05-09
Payer: MEDICARE

## 2023-05-09 ENCOUNTER — RX ONLY (RX ONLY)
Age: 73
End: 2023-05-09

## 2023-05-09 DIAGNOSIS — Z96.1: ICD-10-CM

## 2023-05-09 PROCEDURE — 99024 POSTOP FOLLOW-UP VISIT: CPT | Performed by: STUDENT IN AN ORGANIZED HEALTH CARE EDUCATION/TRAINING PROGRAM

## 2023-05-09 ASSESSMENT — REFRACTION_AUTOREFRACTION
OS_SPHERE: -2.75
OD_SPHERE: +0.25
OD_AXIS: 38
OD_CYLINDER: -1.75
OS_CYLINDER: -4.00
OS_AXIS: 3

## 2023-05-09 ASSESSMENT — LID EXAM ASSESSMENTS
OS_BLEPHARITIS: 2+
OD_FRONTALIS_USE: 1+
OS_MRD1: 1.5 MM
OD_LEVATOR_FUNCTION: 20 MM
OD_MRD1: 1.5 MM
OD_BLEPHARITIS: 2+
OS_LEVATOR_FUNCTION: 20 MM

## 2023-05-09 ASSESSMENT — KERATOMETRY
OD_K2POWER_DIOPTERS: 48.00
OD_K1POWER_DIOPTERS: 43.75
OS_K2POWER_DIOPTERS: 47.75
OD_AXISANGLE_DEGREES: 80
OS_AXISANGLE_DEGREES: 97
METHOD_AUTO_MANUAL: AUTO
OS_K1POWER_DIOPTERS: 44.50

## 2023-05-09 ASSESSMENT — REFRACTION_CURRENTRX
OD_OVR_VA: 20/
OD_ADD: +3.00
OD_AXIS: 168
OD_CYLINDER: -4.50
OS_VPRISM_DIRECTION: PROGS
OD_SPHERE: -2.75
OS_SPHERE: -2.50
OS_ADD: +3.00
OS_OVR_VA: 20/
OD_VPRISM_DIRECTION: PROGS
OS_AXIS: 005
OS_CYLINDER: +3.50

## 2023-05-09 ASSESSMENT — REFRACTION_MANIFEST
OS_CYLINDER: -4.25
OS_AXIS: 180
OS_SPHERE: -2.50
OD_AXIS: 180
OD_SPHERE: -2.25
OD_CYLINDER: -4.50

## 2023-05-09 ASSESSMENT — CONFRONTATIONAL VISUAL FIELD TEST (CVF)
OD_FINDINGS: FULL
OS_FINDINGS: FULL

## 2023-05-09 ASSESSMENT — VISUAL ACUITY
OD_BCVA: 20/30
OS_BCVA: 20/30-1

## 2023-05-09 ASSESSMENT — LID POSITION - PTOSIS
OS_PTOSIS: LUL
OD_PTOSIS: RUL

## 2023-05-09 ASSESSMENT — TONOMETRY
OD_IOP_MMHG: 14
OS_IOP_MMHG: 16
OD_IOP_MMHG: 18

## 2023-05-09 ASSESSMENT — SPHEQUIV_DERIVED
OS_SPHEQUIV: -4.75
OD_SPHEQUIV: -4.5
OS_SPHEQUIV: -4.625
OD_SPHEQUIV: -0.625

## 2023-05-09 ASSESSMENT — AXIALLENGTH_DERIVED
OD_AL: 22.9794
OD_AL: 24.5069
OS_AL: 24.5129
OS_AL: 24.4605

## 2023-05-09 ASSESSMENT — LID POSITION - DERMATOCHALASIS
OS_DERMATOCHALASIS: T
OD_DERMATOCHALASIS: T

## 2023-05-19 ENCOUNTER — OFFICE (OUTPATIENT)
Dept: URBAN - METROPOLITAN AREA CLINIC 12 | Facility: CLINIC | Age: 73
Setting detail: OPHTHALMOLOGY
End: 2023-05-19
Payer: MEDICARE

## 2023-05-19 DIAGNOSIS — H25.12: ICD-10-CM

## 2023-05-19 PROCEDURE — 92136 OPHTHALMIC BIOMETRY: CPT | Performed by: OPHTHALMOLOGY

## 2023-05-19 ASSESSMENT — AXIALLENGTH_DERIVED
OS_AL: 24.4605
OD_AL: 24.5069
OD_AL: 22.8873
OS_AL: 24.5129

## 2023-05-19 ASSESSMENT — LID POSITION - DERMATOCHALASIS
OD_DERMATOCHALASIS: T
OS_DERMATOCHALASIS: T

## 2023-05-19 ASSESSMENT — REFRACTION_AUTOREFRACTION
OD_SPHERE: +0.50
OD_CYLINDER: -1.75
OS_AXIS: 177
OD_AXIS: 040
OS_SPHERE: -2.75
OS_CYLINDER: -4.00

## 2023-05-19 ASSESSMENT — LID POSITION - PTOSIS
OD_PTOSIS: RUL
OS_PTOSIS: LUL

## 2023-05-19 ASSESSMENT — REFRACTION_MANIFEST
OS_SPHERE: -2.50
OD_SPHERE: -2.25
OD_CYLINDER: -4.50
OD_AXIS: 180
OS_CYLINDER: -4.25
OS_AXIS: 180

## 2023-05-19 ASSESSMENT — REFRACTION_CURRENTRX
OS_VPRISM_DIRECTION: PROGS
OS_ADD: +3.00
OD_VPRISM_DIRECTION: PROGS
OS_SPHERE: -2.50
OS_AXIS: 005
OD_SPHERE: -2.75
OD_OVR_VA: 20/
OS_OVR_VA: 20/
OD_AXIS: 168
OS_CYLINDER: +3.50
OD_ADD: +3.00
OD_CYLINDER: -4.50

## 2023-05-19 ASSESSMENT — VISUAL ACUITY
OS_BCVA: 20/40
OD_BCVA: 20/50

## 2023-05-19 ASSESSMENT — LID EXAM ASSESSMENTS
OS_LEVATOR_FUNCTION: 20 MM
OD_FRONTALIS_USE: 1+
OD_LEVATOR_FUNCTION: 20 MM
OD_BLEPHARITIS: 2+
OD_MRD1: 1.5 MM
OS_MRD1: 1.5 MM
OS_BLEPHARITIS: 2+

## 2023-05-19 ASSESSMENT — CONFRONTATIONAL VISUAL FIELD TEST (CVF)
OD_FINDINGS: FULL
OS_FINDINGS: FULL

## 2023-05-19 ASSESSMENT — SPHEQUIV_DERIVED
OS_SPHEQUIV: -4.75
OD_SPHEQUIV: -0.375
OD_SPHEQUIV: -4.5
OS_SPHEQUIV: -4.625

## 2023-05-19 ASSESSMENT — KERATOMETRY
METHOD_AUTO_MANUAL: AUTO
OS_AXISANGLE_DEGREES: 091
OS_K2POWER_DIOPTERS: 48.00
OD_AXISANGLE_DEGREES: 084
OD_K2POWER_DIOPTERS: 47.75
OS_K1POWER_DIOPTERS: 44.25
OD_K1POWER_DIOPTERS: 44.00

## 2023-05-19 ASSESSMENT — TONOMETRY
OD_IOP_MMHG: 19
OS_IOP_MMHG: 19

## 2023-05-22 ENCOUNTER — FORM ENCOUNTER (OUTPATIENT)
Age: 73
End: 2023-05-22

## 2023-05-22 ENCOUNTER — ASC (OUTPATIENT)
Dept: URBAN - METROPOLITAN AREA SURGERY 8 | Facility: SURGERY | Age: 73
Setting detail: OPHTHALMOLOGY
End: 2023-05-22
Payer: MEDICARE

## 2023-05-22 DIAGNOSIS — H52.212: ICD-10-CM

## 2023-05-22 DIAGNOSIS — H25.12: ICD-10-CM

## 2023-05-22 PROCEDURE — FEMTO FEMTOSECOND LASER: Performed by: OPHTHALMOLOGY

## 2023-05-22 PROCEDURE — A9270 NON-COVERED ITEM OR SERVICE: HCPCS | Performed by: OPHTHALMOLOGY

## 2023-05-22 PROCEDURE — 66984 XCAPSL CTRC RMVL W/O ECP: CPT | Performed by: OPHTHALMOLOGY

## 2023-05-22 PROCEDURE — V2787 ASTIGMATISM-CORRECT FUNCTION: HCPCS | Performed by: OPHTHALMOLOGY

## 2023-05-23 ENCOUNTER — OFFICE (OUTPATIENT)
Dept: URBAN - METROPOLITAN AREA CLINIC 12 | Facility: CLINIC | Age: 73
Setting detail: OPHTHALMOLOGY
End: 2023-05-23
Payer: MEDICARE

## 2023-05-23 ENCOUNTER — RX ONLY (RX ONLY)
Age: 73
End: 2023-05-23

## 2023-05-23 DIAGNOSIS — Z96.1: ICD-10-CM

## 2023-05-23 PROCEDURE — 99024 POSTOP FOLLOW-UP VISIT: CPT | Performed by: OPTOMETRIST

## 2023-05-23 ASSESSMENT — REFRACTION_CURRENTRX
OS_SPHERE: -2.50
OD_OVR_VA: 20/
OD_VPRISM_DIRECTION: PROGS
OD_CYLINDER: -4.50
OD_SPHERE: -2.75
OS_VPRISM_DIRECTION: PROGS
OS_AXIS: 005
OD_ADD: +3.00
OS_OVR_VA: 20/
OS_CYLINDER: +3.50
OS_ADD: +3.00
OD_AXIS: 168

## 2023-05-23 ASSESSMENT — REFRACTION_MANIFEST
OD_AXIS: 180
OS_AXIS: 180
OS_SPHERE: -2.50
OD_CYLINDER: -4.50
OD_SPHERE: -2.25
OS_CYLINDER: -4.25

## 2023-05-23 ASSESSMENT — REFRACTION_AUTOREFRACTION
OD_CYLINDER: -1.75
OS_AXIS: 121
OS_CYLINDER: -1.25
OD_AXIS: 037
OD_SPHERE: +0.25
OS_SPHERE: -0.25

## 2023-05-23 ASSESSMENT — CONFRONTATIONAL VISUAL FIELD TEST (CVF)
OD_FINDINGS: FULL
OS_FINDINGS: FULL

## 2023-05-23 ASSESSMENT — LID POSITION - PTOSIS
OD_PTOSIS: RUL
OS_PTOSIS: LUL

## 2023-05-23 ASSESSMENT — AXIALLENGTH_DERIVED
OD_AL: 24.4575
OS_AL: 24.8109
OS_AL: 23.2938
OD_AL: 22.9359

## 2023-05-23 ASSESSMENT — LID EXAM ASSESSMENTS
OD_BLEPHARITIS: 2+
OD_MRD1: 1.5 MM
OS_LEVATOR_FUNCTION: 20 MM
OD_LEVATOR_FUNCTION: 20 MM
OS_MRD1: 1.5 MM
OD_FRONTALIS_USE: 1+
OS_BLEPHARITIS: 2+

## 2023-05-23 ASSESSMENT — TONOMETRY
OS_IOP_MMHG: 18
OD_IOP_MMHG: 18

## 2023-05-23 ASSESSMENT — SPHEQUIV_DERIVED
OS_SPHEQUIV: -4.625
OD_SPHEQUIV: -0.625
OS_SPHEQUIV: -0.875
OD_SPHEQUIV: -4.5

## 2023-05-23 ASSESSMENT — KERATOMETRY
OD_K1POWER_DIOPTERS: 44.00
OD_AXISANGLE_DEGREES: 087
OS_K2POWER_DIOPTERS: 47.50
OD_K2POWER_DIOPTERS: 48.00
OS_AXISANGLE_DEGREES: 86
METHOD_AUTO_MANUAL: AUTO
OS_K1POWER_DIOPTERS: 43.00

## 2023-05-23 ASSESSMENT — LID POSITION - DERMATOCHALASIS
OS_DERMATOCHALASIS: T
OD_DERMATOCHALASIS: T

## 2023-05-23 ASSESSMENT — CORNEAL EDEMA CLINICAL DESCRIPTION: OS_CORNEALEDEMA: T

## 2023-05-23 ASSESSMENT — VISUAL ACUITY
OD_BCVA: 20/70-1
OS_BCVA: 20/50-1

## 2023-06-02 ENCOUNTER — OFFICE (OUTPATIENT)
Dept: URBAN - METROPOLITAN AREA CLINIC 12 | Facility: CLINIC | Age: 73
Setting detail: OPHTHALMOLOGY
End: 2023-06-02
Payer: MEDICARE

## 2023-06-02 DIAGNOSIS — H25.13: ICD-10-CM

## 2023-06-02 PROBLEM — Z96.1 PSEUDOPHAKIA- P/O CAT W/ IOL ; BOTH EYES: Status: ACTIVE | Noted: 2023-05-09

## 2023-06-02 PROCEDURE — 99024 POSTOP FOLLOW-UP VISIT: CPT | Performed by: OPHTHALMOLOGY

## 2023-06-02 ASSESSMENT — REFRACTION_CURRENTRX
OS_CYLINDER: +3.50
OS_AXIS: 005
OS_ADD: +3.00
OS_OVR_VA: 20/
OD_AXIS: 168
OD_ADD: +3.00
OS_SPHERE: -2.50
OD_SPHERE: -2.75
OS_VPRISM_DIRECTION: PROGS
OD_VPRISM_DIRECTION: PROGS
OD_OVR_VA: 20/
OD_CYLINDER: -4.50

## 2023-06-02 ASSESSMENT — REFRACTION_MANIFEST
OD_CYLINDER: -4.50
OS_CYLINDER: -4.25
OD_AXIS: 180
OS_AXIS: 180
OD_SPHERE: -2.25
OS_SPHERE: -2.50

## 2023-06-02 ASSESSMENT — LID EXAM ASSESSMENTS
OD_BLEPHARITIS: 2+
OS_MRD1: 1.5 MM
OD_MRD1: 1.5 MM
OS_BLEPHARITIS: 2+
OD_LEVATOR_FUNCTION: 20 MM
OD_FRONTALIS_USE: 1+
OS_LEVATOR_FUNCTION: 20 MM

## 2023-06-02 ASSESSMENT — AXIALLENGTH_DERIVED
OD_AL: 24.4575
OS_AL: 23.0615
OD_AL: 22.89
OS_AL: 24.7603

## 2023-06-02 ASSESSMENT — KERATOMETRY
OD_K1POWER_DIOPTERS: 44.00
OS_K2POWER_DIOPTERS: 47.25
OD_AXISANGLE_DEGREES: 085
OS_AXISANGLE_DEGREES: 084
OS_K1POWER_DIOPTERS: 43.50
OD_K2POWER_DIOPTERS: 48.00
METHOD_AUTO_MANUAL: AUTO

## 2023-06-02 ASSESSMENT — LID POSITION - DERMATOCHALASIS
OS_DERMATOCHALASIS: T
OD_DERMATOCHALASIS: T

## 2023-06-02 ASSESSMENT — CONFRONTATIONAL VISUAL FIELD TEST (CVF)
OS_FINDINGS: FULL
OD_FINDINGS: FULL

## 2023-06-02 ASSESSMENT — TONOMETRY
OS_IOP_MMHG: 15
OD_IOP_MMHG: 15

## 2023-06-02 ASSESSMENT — REFRACTION_AUTOREFRACTION
OD_CYLINDER: -1.50
OS_CYLINDER: -1.25
OS_AXIS: 105
OS_SPHERE: +0.25
OD_AXIS: 040
OD_SPHERE: +0.25

## 2023-06-02 ASSESSMENT — LID POSITION - PTOSIS
OD_PTOSIS: RUL
OS_PTOSIS: LUL

## 2023-06-02 ASSESSMENT — CORNEAL EDEMA CLINICAL DESCRIPTION: OS_CORNEALEDEMA: T

## 2023-06-02 ASSESSMENT — SPHEQUIV_DERIVED
OD_SPHEQUIV: -0.5
OS_SPHEQUIV: -4.625
OD_SPHEQUIV: -4.5
OS_SPHEQUIV: -0.375

## 2023-06-02 ASSESSMENT — VISUAL ACUITY
OS_BCVA: 20/50
OD_BCVA: 20/40

## 2023-06-22 ENCOUNTER — FORM ENCOUNTER (OUTPATIENT)
Age: 73
End: 2023-06-22

## 2023-06-23 ENCOUNTER — OFFICE (OUTPATIENT)
Dept: URBAN - METROPOLITAN AREA CLINIC 12 | Facility: CLINIC | Age: 73
Setting detail: OPHTHALMOLOGY
End: 2023-06-23
Payer: MEDICARE

## 2023-06-23 DIAGNOSIS — H16.223: ICD-10-CM

## 2023-06-23 DIAGNOSIS — H26.491: ICD-10-CM

## 2023-06-23 DIAGNOSIS — Z96.1: ICD-10-CM

## 2023-06-23 PROCEDURE — 99024 POSTOP FOLLOW-UP VISIT: CPT | Performed by: OPHTHALMOLOGY

## 2023-06-23 ASSESSMENT — LID EXAM ASSESSMENTS
OD_MRD1: 1.5 MM
OS_BLEPHARITIS: 2+
OS_LEVATOR_FUNCTION: 20 MM
OD_LEVATOR_FUNCTION: 20 MM
OD_BLEPHARITIS: 2+
OS_MRD1: 1.5 MM
OD_FRONTALIS_USE: 1+

## 2023-06-23 ASSESSMENT — TONOMETRY
OS_IOP_MMHG: 15
OD_IOP_MMHG: 12

## 2023-06-23 ASSESSMENT — LID POSITION - DERMATOCHALASIS
OD_DERMATOCHALASIS: T
OS_DERMATOCHALASIS: T

## 2023-06-23 ASSESSMENT — CONFRONTATIONAL VISUAL FIELD TEST (CVF)
OS_FINDINGS: FULL
OD_FINDINGS: FULL

## 2023-06-23 ASSESSMENT — LID POSITION - PTOSIS
OD_PTOSIS: RUL
OS_PTOSIS: LUL

## 2023-07-06 ASSESSMENT — REFRACTION_MANIFEST
OS_CYLINDER: -4.25
OS_AXIS: 180
OD_AXIS: 180
OS_SPHERE: -2.50
OD_CYLINDER: -4.50
OD_SPHERE: -2.25

## 2023-07-06 ASSESSMENT — REFRACTION_CURRENTRX
OD_SPHERE: -2.75
OS_SPHERE: -2.50
OD_AXIS: 168
OS_AXIS: 005
OS_CYLINDER: +3.50
OD_OVR_VA: 20/
OD_VPRISM_DIRECTION: PROGS
OS_ADD: +3.00
OS_VPRISM_DIRECTION: PROGS
OD_ADD: +3.00
OS_OVR_VA: 20/
OD_CYLINDER: -4.50

## 2023-07-06 ASSESSMENT — REFRACTION_AUTOREFRACTION
OD_CYLINDER: -2.00
OS_SPHERE: PLANO
OS_AXIS: 106
OD_SPHERE: +0.25
OD_AXIS: 037
OS_CYLINDER: -1.00

## 2023-07-06 ASSESSMENT — KERATOMETRY
OD_K1POWER_DIOPTERS: 43.75
OS_K2POWER_DIOPTERS: 47.50
METHOD_AUTO_MANUAL: AUTO
OD_K2POWER_DIOPTERS: 48.50
OS_K1POWER_DIOPTERS: 43.75
OS_AXISANGLE_DEGREES: 084
OD_AXISANGLE_DEGREES: 086

## 2023-07-06 ASSESSMENT — VISUAL ACUITY
OS_BCVA: 20/40
OD_BCVA: 20/40

## 2023-07-06 ASSESSMENT — SPHEQUIV_DERIVED
OS_SPHEQUIV: -4.625
OD_SPHEQUIV: -0.75
OD_SPHEQUIV: -4.5

## 2023-07-06 ASSESSMENT — AXIALLENGTH_DERIVED
OD_AL: 22.9385
OD_AL: 24.4082
OS_AL: 24.6595

## 2023-08-04 ENCOUNTER — APPOINTMENT (OUTPATIENT)
Dept: FAMILY MEDICINE | Facility: CLINIC | Age: 73
End: 2023-08-04
Payer: MEDICARE

## 2023-08-04 VITALS
DIASTOLIC BLOOD PRESSURE: 85 MMHG | OXYGEN SATURATION: 97 % | HEART RATE: 87 BPM | BODY MASS INDEX: 23.6 KG/M2 | WEIGHT: 125 LBS | SYSTOLIC BLOOD PRESSURE: 135 MMHG | HEIGHT: 61 IN | TEMPERATURE: 97.1 F

## 2023-08-04 DIAGNOSIS — Z00.00 ENCOUNTER FOR GENERAL ADULT MEDICAL EXAMINATION W/OUT ABNORMAL FINDINGS: ICD-10-CM

## 2023-08-04 DIAGNOSIS — T88.59XA OTHER COMPLICATIONS OF ANESTHESIA, INITIAL ENCOUNTER: ICD-10-CM

## 2023-08-04 DIAGNOSIS — R14.0 ABDOMINAL DISTENSION (GASEOUS): ICD-10-CM

## 2023-08-04 DIAGNOSIS — K90.41 NON-CELIAC GLUTEN SENSITIVITY: ICD-10-CM

## 2023-08-04 DIAGNOSIS — K90.49 MALABSORPTION DUE TO INTOLERANCE, NOT ELSEWHERE CLASSIFIED: ICD-10-CM

## 2023-08-04 DIAGNOSIS — I10 ESSENTIAL (PRIMARY) HYPERTENSION: ICD-10-CM

## 2023-08-04 PROCEDURE — 36415 COLL VENOUS BLD VENIPUNCTURE: CPT

## 2023-08-04 PROCEDURE — G0439: CPT

## 2023-08-04 PROCEDURE — 99213 OFFICE O/P EST LOW 20 MIN: CPT | Mod: 25

## 2023-08-04 NOTE — HISTORY OF PRESENT ILLNESS
[FreeTextEntry1] : HARISH QUINONES is a 73 year old female here for a physical exam. [de-identified] : Her last physical exam was last year  Vaccines: Tetanus is NOT up to date Pneumococcal vaccine is NOT up to date Shingrix is NOT up to date COVID vaccine is NOT up to date  Her last dentist visit was less than one year ago Her last eye doctor appointment was less than one year ago Her last dermatologist visit was a couple years ago  GYN visit is up to date Mammogram is up to date Colon cancer screening is up to date, 9/2021, Dr. Kamara, repeat 3 years DEXA is NOT up to date  Her diet is healthy overall Exercise: treadmill

## 2023-08-04 NOTE — HEALTH RISK ASSESSMENT
[No falls in past year] : Patient reported no falls in the past year [0] : 2) Feeling down, depressed, or hopeless: Not at all (0) [PHQ-2 Negative - No further assessment needed] : PHQ-2 Negative - No further assessment needed [Never] : Never [[id number]] : 0

## 2023-08-04 NOTE — ASSESSMENT
[FreeTextEntry1] : HARISH QUINONES is a 73 year old female here for a physical exam.  She has a history of white coat hypertension.  She sees a cardiologist regularly and does not need an EKG today.   She had labs done in March. Her CBC and TSH were normal. Her fasting glucose (102) and HgbA1c (5.7) were both borderline high. Her cholesterol was higher than last year but still fairly good. Her vitamin D was high at 110. She was advised to decrease her vitamin D supplement and recheck this in a few months.  She is sensitive to gluten and dairy. She believes she has celiac disease though she was never tested for this. She feels that she has abdominal bloating despite avoiding gluten and dairy. She also stopped taking supplements in capsules. She only takes tablets now. She feels that she is sensitive to capsules.  She had cataract surgery this year and feels that it took her a long time to recover from the anesthesia. She would like  this documented in her chart.

## 2023-08-06 ENCOUNTER — TRANSCRIPTION ENCOUNTER (OUTPATIENT)
Age: 73
End: 2023-08-06

## 2023-08-06 LAB
25(OH)D3 SERPL-MCNC: 99.1 NG/ML
ALBUMIN SERPL ELPH-MCNC: 4.8 G/DL
ALP BLD-CCNC: 86 U/L
ALT SERPL-CCNC: 18 U/L
ANION GAP SERPL CALC-SCNC: 12 MMOL/L
AST SERPL-CCNC: 19 U/L
BILIRUB SERPL-MCNC: 0.5 MG/DL
BUN SERPL-MCNC: 13 MG/DL
CALCIUM SERPL-MCNC: 9.8 MG/DL
CHLORIDE SERPL-SCNC: 101 MMOL/L
CHOLEST SERPL-MCNC: 210 MG/DL
CO2 SERPL-SCNC: 27 MMOL/L
CREAT SERPL-MCNC: 0.96 MG/DL
CREAT SPEC-SCNC: 40 MG/DL
EGFR: 63 ML/MIN/1.73M2
ESTIMATED AVERAGE GLUCOSE: 114 MG/DL
GLUCOSE SERPL-MCNC: 93 MG/DL
HBA1C MFR BLD HPLC: 5.6 %
HDLC SERPL-MCNC: 74 MG/DL
LDLC SERPL CALC-MCNC: 124 MG/DL
MICROALBUMIN 24H UR DL<=1MG/L-MCNC: <1.2 MG/DL
MICROALBUMIN/CREAT 24H UR-RTO: NORMAL MG/G
NONHDLC SERPL-MCNC: 137 MG/DL
POTASSIUM SERPL-SCNC: 4.3 MMOL/L
PROT SERPL-MCNC: 6.8 G/DL
SODIUM SERPL-SCNC: 140 MMOL/L
TRIGL SERPL-MCNC: 68 MG/DL

## 2023-08-07 ENCOUNTER — TRANSCRIPTION ENCOUNTER (OUTPATIENT)
Age: 73
End: 2023-08-07

## 2023-08-14 ENCOUNTER — APPOINTMENT (OUTPATIENT)
Dept: FAMILY MEDICINE | Facility: CLINIC | Age: 73
End: 2023-08-14

## 2023-09-22 ENCOUNTER — RX ONLY (RX ONLY)
Age: 73
End: 2023-09-22

## 2023-09-22 ENCOUNTER — OFFICE (OUTPATIENT)
Dept: URBAN - METROPOLITAN AREA CLINIC 12 | Facility: CLINIC | Age: 73
Setting detail: OPHTHALMOLOGY
End: 2023-09-22
Payer: MEDICARE

## 2023-09-22 DIAGNOSIS — H26.491: ICD-10-CM

## 2023-09-22 DIAGNOSIS — H35.3132: ICD-10-CM

## 2023-09-22 DIAGNOSIS — H43.391: ICD-10-CM

## 2023-09-22 DIAGNOSIS — H35.371: ICD-10-CM

## 2023-09-22 DIAGNOSIS — H35.411: ICD-10-CM

## 2023-09-22 DIAGNOSIS — H02.403: ICD-10-CM

## 2023-09-22 DIAGNOSIS — H43.392: ICD-10-CM

## 2023-09-22 DIAGNOSIS — D31.32: ICD-10-CM

## 2023-09-22 DIAGNOSIS — H16.223: ICD-10-CM

## 2023-09-22 DIAGNOSIS — H43.813: ICD-10-CM

## 2023-09-22 PROCEDURE — 92014 COMPRE OPH EXAM EST PT 1/>: CPT | Performed by: OPHTHALMOLOGY

## 2023-09-22 PROCEDURE — 92134 CPTRZ OPH DX IMG PST SGM RTA: CPT | Performed by: OPHTHALMOLOGY

## 2023-09-22 ASSESSMENT — VISUAL ACUITY
OD_BCVA: 20/30-2
OS_BCVA: 20/60-2

## 2023-09-22 ASSESSMENT — REFRACTION_CURRENTRX
OS_SPHERE: -2.50
OS_CYLINDER: +3.50
OD_ADD: +3.00
OD_OVR_VA: 20/
OS_AXIS: 005
OS_OVR_VA: 20/
OD_VPRISM_DIRECTION: PROGS
OD_AXIS: 168
OS_ADD: +3.00
OD_CYLINDER: -4.50
OD_SPHERE: -2.75
OS_VPRISM_DIRECTION: PROGS

## 2023-09-22 ASSESSMENT — KERATOMETRY
OS_K1POWER_DIOPTERS: 44.00
OD_AXISANGLE_DEGREES: 086
OS_K2POWER_DIOPTERS: 47.25
OD_K1POWER_DIOPTERS: 44.00
OS_AXISANGLE_DEGREES: 088
METHOD_AUTO_MANUAL: AUTO
OD_K2POWER_DIOPTERS: 48.25

## 2023-09-22 ASSESSMENT — SPHEQUIV_DERIVED
OD_SPHEQUIV: -4.5
OS_SPHEQUIV: -0.125
OS_SPHEQUIV: -4.625
OD_SPHEQUIV: -0.75

## 2023-09-22 ASSESSMENT — REFRACTION_MANIFEST
OS_CYLINDER: -4.25
OD_CYLINDER: -4.50
OD_SPHERE: -2.25
OS_AXIS: 180
OS_SPHERE: -2.50
OD_AXIS: 180

## 2023-09-22 ASSESSMENT — CONFRONTATIONAL VISUAL FIELD TEST (CVF)
OS_FINDINGS: FULL
OD_FINDINGS: FULL

## 2023-09-22 ASSESSMENT — AXIALLENGTH_DERIVED
OD_AL: 22.9385
OD_AL: 24.4082
OS_AL: 24.6595
OS_AL: 22.8821

## 2023-09-22 ASSESSMENT — LID EXAM ASSESSMENTS
OD_FRONTALIS_USE: 1+
OD_BLEPHARITIS: 2+
OD_MRD1: 1.5 MM
OS_MRD1: 1.5 MM
OS_LEVATOR_FUNCTION: 20 MM
OS_BLEPHARITIS: 2+
OD_LEVATOR_FUNCTION: 20 MM

## 2023-09-22 ASSESSMENT — REFRACTION_AUTOREFRACTION
OD_SPHERE: +0.25
OS_AXIS: 092
OS_SPHERE: +0.50
OD_CYLINDER: -2.00
OD_AXIS: 036
OS_CYLINDER: -1.25

## 2023-09-22 ASSESSMENT — TONOMETRY
OD_IOP_MMHG: 16
OS_IOP_MMHG: 17

## 2023-09-22 ASSESSMENT — LID POSITION - PTOSIS
OS_PTOSIS: LUL
OD_PTOSIS: RUL

## 2023-09-22 ASSESSMENT — LID POSITION - DERMATOCHALASIS
OD_DERMATOCHALASIS: T
OS_DERMATOCHALASIS: T

## 2023-09-22 ASSESSMENT — SUPERFICIAL PUNCTATE KERATITIS (SPK)
OS_SPK: 1+
OD_SPK: 1+ 2+

## 2023-11-27 ENCOUNTER — TRANSCRIPTION ENCOUNTER (OUTPATIENT)
Age: 73
End: 2023-11-27

## 2023-11-28 ENCOUNTER — TRANSCRIPTION ENCOUNTER (OUTPATIENT)
Age: 73
End: 2023-11-28

## 2023-12-22 ENCOUNTER — OFFICE (OUTPATIENT)
Dept: URBAN - METROPOLITAN AREA CLINIC 12 | Facility: CLINIC | Age: 73
Setting detail: OPHTHALMOLOGY
End: 2023-12-22
Payer: MEDICARE

## 2023-12-22 DIAGNOSIS — H26.492: ICD-10-CM

## 2023-12-22 DIAGNOSIS — H35.411: ICD-10-CM

## 2023-12-22 DIAGNOSIS — H16.223: ICD-10-CM

## 2023-12-22 DIAGNOSIS — H43.391: ICD-10-CM

## 2023-12-22 DIAGNOSIS — H02.403: ICD-10-CM

## 2023-12-22 DIAGNOSIS — H26.491: ICD-10-CM

## 2023-12-22 DIAGNOSIS — H35.371: ICD-10-CM

## 2023-12-22 DIAGNOSIS — H43.813: ICD-10-CM

## 2023-12-22 DIAGNOSIS — H35.3132: ICD-10-CM

## 2023-12-22 DIAGNOSIS — H26.493: ICD-10-CM

## 2023-12-22 DIAGNOSIS — D31.32: ICD-10-CM

## 2023-12-22 DIAGNOSIS — H43.392: ICD-10-CM

## 2023-12-22 PROBLEM — H52.7 REFRACTIVE ERROR ; BOTH EYES: Status: ACTIVE | Noted: 2023-12-22

## 2023-12-22 PROCEDURE — 92250 FUNDUS PHOTOGRAPHY W/I&R: CPT | Performed by: OPHTHALMOLOGY

## 2023-12-22 PROCEDURE — 92014 COMPRE OPH EXAM EST PT 1/>: CPT | Performed by: OPHTHALMOLOGY

## 2023-12-22 ASSESSMENT — REFRACTION_CURRENTRX
OD_CYLINDER: -4.50
OS_ADD: +3.00
OD_AXIS: 168
OS_OVR_VA: 20/
OD_ADD: +3.00
OD_VPRISM_DIRECTION: PROGS
OS_VPRISM_DIRECTION: PROGS
OD_OVR_VA: 20/
OS_AXIS: 005
OS_SPHERE: -2.50
OS_CYLINDER: +3.50
OD_SPHERE: -2.75

## 2023-12-22 ASSESSMENT — LID POSITION - PTOSIS
OS_PTOSIS: LUL
OD_PTOSIS: RUL

## 2023-12-22 ASSESSMENT — SPHEQUIV_DERIVED
OD_SPHEQUIV: -0.625
OD_SPHEQUIV: -4.5
OS_SPHEQUIV: -0.375
OS_SPHEQUIV: -4.625

## 2023-12-22 ASSESSMENT — REFRACTION_MANIFEST
OD_AXIS: 180
OS_SPHERE: -2.50
OD_SPHERE: -2.25
OS_AXIS: 180
OS_CYLINDER: -4.25
OD_CYLINDER: -4.50

## 2023-12-22 ASSESSMENT — LID EXAM ASSESSMENTS
OS_BLEPHARITIS: 2+
OD_FRONTALIS_USE: 1+
OS_MRD1: 1.5 MM
OD_MRD1: 1.5 MM
OS_LEVATOR_FUNCTION: 20 MM
OD_BLEPHARITIS: 2+
OD_LEVATOR_FUNCTION: 20 MM

## 2023-12-22 ASSESSMENT — CONFRONTATIONAL VISUAL FIELD TEST (CVF)
OD_FINDINGS: FULL
OS_FINDINGS: FULL

## 2023-12-22 ASSESSMENT — REFRACTION_AUTOREFRACTION
OD_SPHERE: +0.50
OD_CYLINDER: -2.25
OD_AXIS: 032
OS_CYLINDER: -1.25
OS_AXIS: 098
OS_SPHERE: +0.25

## 2023-12-22 ASSESSMENT — SUPERFICIAL PUNCTATE KERATITIS (SPK)
OS_SPK: 1+
OD_SPK: 1+ 2+

## 2023-12-22 ASSESSMENT — LID POSITION - DERMATOCHALASIS
OD_DERMATOCHALASIS: T
OS_DERMATOCHALASIS: T

## 2024-01-19 ENCOUNTER — APPOINTMENT (OUTPATIENT)
Dept: DERMATOLOGY | Facility: CLINIC | Age: 74
End: 2024-01-19
Payer: MEDICARE

## 2024-01-19 ENCOUNTER — NON-APPOINTMENT (OUTPATIENT)
Age: 74
End: 2024-01-19

## 2024-01-19 VITALS — BODY MASS INDEX: 24.17 KG/M2 | HEIGHT: 61 IN | WEIGHT: 128 LBS

## 2024-01-19 DIAGNOSIS — D18.01 HEMANGIOMA OF SKIN AND SUBCUTANEOUS TISSUE: ICD-10-CM

## 2024-01-19 DIAGNOSIS — L82.0 INFLAMED SEBORRHEIC KERATOSIS: ICD-10-CM

## 2024-01-19 DIAGNOSIS — L81.4 OTHER MELANIN HYPERPIGMENTATION: ICD-10-CM

## 2024-01-19 DIAGNOSIS — L73.8 OTHER SPECIFIED FOLLICULAR DISORDERS: ICD-10-CM

## 2024-01-19 DIAGNOSIS — L82.1 OTHER SEBORRHEIC KERATOSIS: ICD-10-CM

## 2024-01-19 DIAGNOSIS — D22.9 MELANOCYTIC NEVI, UNSPECIFIED: ICD-10-CM

## 2024-01-19 PROCEDURE — 17110 DESTRUCTION B9 LES UP TO 14: CPT

## 2024-01-19 PROCEDURE — 99203 OFFICE O/P NEW LOW 30 MIN: CPT | Mod: 25

## 2024-01-19 NOTE — PHYSICAL EXAM
[Alert] : alert [Oriented x 3] : ~L oriented x 3 [Well Nourished] : well nourished [Conjunctiva Non-injected] : conjunctiva non-injected [No Visual Lymphadenopathy] : no visual  lymphadenopathy [No Clubbing] : no clubbing [No Edema] : no edema [No Bromhidrosis] : no bromhidrosis [No Chromhidrosis] : no chromhidrosis [FreeTextEntry3] : A full skin exam was performed including the scalp, face (including the lips, ears, nose and eyes), neck, chest, breasts, abdomen, back, buttocks, upper extremities and lower extremities.  The patient declined examination of the genitalia.   The exam revealed the following benign growths:  Pigmented nevi.  Seborrheic keratoses.  Lentigines.  Cherry angiomas.  Inflamed, waxy, keratotic papule of the nose  .

## 2024-01-19 NOTE — HISTORY OF PRESENT ILLNESS
[FreeTextEntry1] : FBSE [de-identified] : Patient presents for skin check; No itching, bleeding, growing, changing lesions noted. No personal hx of skin cancer. Positive family hx of NMSC in mother.

## 2024-01-19 NOTE — ASSESSMENT
[FreeTextEntry1] : A complete skin examination was performed.  There is no evidence of skin cancer.  We discussed the importance of photoprotection, including the use of hats, protective clothing and sunscreens with an SPF of at least 30.  Sun avoidance was also discussed.  The ABCDE's of melanoma were discussed.  Regular skin exams recommended.  Inflamed SK: LN2 x 1

## 2024-02-21 ENCOUNTER — TRANSCRIPTION ENCOUNTER (OUTPATIENT)
Age: 74
End: 2024-02-21

## 2024-03-22 ENCOUNTER — TRANSCRIPTION ENCOUNTER (OUTPATIENT)
Age: 74
End: 2024-03-22

## 2024-04-22 PROBLEM — H01.011 BLEPHARITIS; RIGHT UPPER LID, LEFT UPPER LID: Status: ACTIVE | Noted: 2024-04-22

## 2024-04-22 PROBLEM — H01.014 BLEPHARITIS; RIGHT UPPER LID, LEFT UPPER LID: Status: ACTIVE | Noted: 2024-04-22

## 2024-05-24 ENCOUNTER — RX ONLY (RX ONLY)
Age: 74
End: 2024-05-24

## 2024-05-24 ENCOUNTER — OFFICE (OUTPATIENT)
Dept: URBAN - METROPOLITAN AREA CLINIC 12 | Facility: CLINIC | Age: 74
Setting detail: OPHTHALMOLOGY
End: 2024-05-24
Payer: MEDICARE

## 2024-05-24 DIAGNOSIS — H26.491: ICD-10-CM

## 2024-05-24 PROBLEM — H43.391 VITREOUS FLOATERS; RIGHT EYE, LEFT EYE: Status: ACTIVE | Noted: 2024-05-24

## 2024-05-24 PROBLEM — H43.392 VITREOUS FLOATERS; RIGHT EYE, LEFT EYE: Status: ACTIVE | Noted: 2024-05-24

## 2024-05-24 PROCEDURE — 66821 AFTER CATARACT LASER SURGERY: CPT | Mod: RT | Performed by: OPHTHALMOLOGY

## 2024-05-24 ASSESSMENT — CONFRONTATIONAL VISUAL FIELD TEST (CVF)
OD_FINDINGS: FULL
OS_FINDINGS: FULL

## 2024-06-14 ENCOUNTER — OFFICE (OUTPATIENT)
Dept: URBAN - METROPOLITAN AREA CLINIC 12 | Facility: CLINIC | Age: 74
Setting detail: OPHTHALMOLOGY
End: 2024-06-14
Payer: MEDICARE

## 2024-06-14 ENCOUNTER — RX ONLY (RX ONLY)
Age: 74
End: 2024-06-14

## 2024-06-14 DIAGNOSIS — H26.492: ICD-10-CM

## 2024-06-14 PROCEDURE — 66821 AFTER CATARACT LASER SURGERY: CPT | Mod: 79,LT | Performed by: OPHTHALMOLOGY

## 2024-06-14 ASSESSMENT — LID EXAM ASSESSMENTS
OS_LEVATOR_FUNCTION: 20 MM
OD_FRONTALIS_USE: 1+
OD_MRD1: 1.5 MM
OD_LEVATOR_FUNCTION: 20 MM
OS_MRD1: 1.5 MM
OS_BLEPHARITIS: 2+
OD_BLEPHARITIS: 2+

## 2024-06-14 ASSESSMENT — CONFRONTATIONAL VISUAL FIELD TEST (CVF)
OD_FINDINGS: FULL
OS_FINDINGS: FULL

## 2024-06-14 ASSESSMENT — LID POSITION - PTOSIS
OD_PTOSIS: RUL
OS_PTOSIS: LUL

## 2024-06-14 ASSESSMENT — LID POSITION - DERMATOCHALASIS
OD_DERMATOCHALASIS: T
OS_DERMATOCHALASIS: T

## 2024-09-13 ENCOUNTER — APPOINTMENT (OUTPATIENT)
Dept: FAMILY MEDICINE | Facility: CLINIC | Age: 74
End: 2024-09-13
Payer: MEDICARE

## 2024-09-13 VITALS
OXYGEN SATURATION: 98 % | HEART RATE: 88 BPM | TEMPERATURE: 97.2 F | HEIGHT: 61 IN | BODY MASS INDEX: 24.92 KG/M2 | WEIGHT: 132 LBS

## 2024-09-13 DIAGNOSIS — I10 ESSENTIAL (PRIMARY) HYPERTENSION: ICD-10-CM

## 2024-09-13 DIAGNOSIS — Z00.00 ENCOUNTER FOR GENERAL ADULT MEDICAL EXAMINATION W/OUT ABNORMAL FINDINGS: ICD-10-CM

## 2024-09-13 PROCEDURE — 36415 COLL VENOUS BLD VENIPUNCTURE: CPT

## 2024-09-13 PROCEDURE — G0439: CPT

## 2024-09-13 NOTE — HEALTH RISK ASSESSMENT
[No falls in past year] : Patient reported no falls in the past year [0] : 2) Feeling down, depressed, or hopeless: Not at all (0) [PHQ-2 Negative - No further assessment needed] : PHQ-2 Negative - No further assessment needed [Never] : Never [[id number]] : 0

## 2024-09-13 NOTE — HEALTH RISK ASSESSMENT
[No falls in past year] : Patient reported no falls in the past year [0] : 2) Feeling down, depressed, or hopeless: Not at all (0) [PHQ-2 Negative - No further assessment needed] : PHQ-2 Negative - No further assessment needed [Never] : Never [[id number]] : 0

## 2024-09-13 NOTE — ASSESSMENT
[FreeTextEntry1] : HARISH QUINONES is a 74 year old female here for a physical exam.  She has a history of white coat hypertension.  She sees a cardiologist (Dr. Snyder) regularly and does not need an EKG today. She wants to have labs for her cardiologist to review.  Her last vitamin D level was too high. She has cut back on her dose and would like to check this level today

## 2024-09-13 NOTE — PLAN
[FreeTextEntry1] : Check labs as above.   Reviewed age-appropriate preventative screening tests with patient. She is due for DEXA and several vaccines (Shingrix, Tdap, Pneumococcal). She declines all of these. She is scheduled to see Dr. Kamara to schedule her colonoscopy. She is also to see her GYN in November and will get an Rx for a mammogram.  Discussed clean eating (eg Mediterranean style eating plan) and regular exercise/staying as physically active as possible. Include balance exercises and strength training and core strengthening exercises for bone health and to decrease risk for falls.  Reviewed importance of good self care (e.g. meditation, yoga, adequate rest, regular exercise, magnesium, clean eating, etc.).  Follow up for next physical in one year.

## 2024-09-13 NOTE — HISTORY OF PRESENT ILLNESS
[FreeTextEntry1] : HARISH QUINONES is a 74 year old female here for a physical exam. [de-identified] : Her last physical exam was last year  Vaccines: Tetanus is NOT up to date Pneumococcal vaccine is NOT up to date Shingrix is NOT up to date  Her last dentist visit was less than one year ago Her last eye doctor appointment was less than one year ago Her last dermatologist visit was a couple years ago  GYN visit is up to date Mammogram is up to date Colon cancer screening is up to date, 9/2021, Dr. Kamara, repeat 3 years DEXA is NOT up to date  Her diet is healthy overall Exercise: treadmill

## 2024-09-13 NOTE — HISTORY OF PRESENT ILLNESS
[FreeTextEntry1] : HARISH QUINONES is a 74 year old female here for a physical exam. [de-identified] : Her last physical exam was last year  Vaccines: Tetanus is NOT up to date Pneumococcal vaccine is NOT up to date Shingrix is NOT up to date  Her last dentist visit was less than one year ago Her last eye doctor appointment was less than one year ago Her last dermatologist visit was a couple years ago  GYN visit is up to date Mammogram is up to date Colon cancer screening is up to date, 9/2021, Dr. Kamara, repeat 3 years DEXA is NOT up to date  Her diet is healthy overall Exercise: treadmill

## 2024-09-14 ENCOUNTER — TRANSCRIPTION ENCOUNTER (OUTPATIENT)
Age: 74
End: 2024-09-14

## 2024-09-14 LAB
25(OH)D3 SERPL-MCNC: 87.3 NG/ML
ALBUMIN SERPL ELPH-MCNC: 4.6 G/DL
ALP BLD-CCNC: 102 U/L
ALT SERPL-CCNC: 30 U/L
ANION GAP SERPL CALC-SCNC: 9 MMOL/L
AST SERPL-CCNC: 29 U/L
BASOPHILS # BLD AUTO: 0.07 K/UL
BASOPHILS NFR BLD AUTO: 0.8 %
BILIRUB SERPL-MCNC: 0.4 MG/DL
BUN SERPL-MCNC: 14 MG/DL
CALCIUM SERPL-MCNC: 10 MG/DL
CHLORIDE SERPL-SCNC: 104 MMOL/L
CHOLEST SERPL-MCNC: 221 MG/DL
CO2 SERPL-SCNC: 28 MMOL/L
CREAT SERPL-MCNC: 1.02 MG/DL
EGFR: 58 ML/MIN/1.73M2
EOSINOPHIL # BLD AUTO: 0.15 K/UL
EOSINOPHIL NFR BLD AUTO: 1.7 %
ESTIMATED AVERAGE GLUCOSE: 117 MG/DL
GLUCOSE SERPL-MCNC: 113 MG/DL
HBA1C MFR BLD HPLC: 5.7 %
HCT VFR BLD CALC: 46.4 %
HDLC SERPL-MCNC: 75 MG/DL
HGB BLD-MCNC: 15.1 G/DL
IMM GRANULOCYTES NFR BLD AUTO: 0.2 %
LDLC SERPL CALC-MCNC: 134 MG/DL
LYMPHOCYTES # BLD AUTO: 1.75 K/UL
LYMPHOCYTES NFR BLD AUTO: 19.4 %
MAN DIFF?: NORMAL
MCHC RBC-ENTMCNC: 31.7 PG
MCHC RBC-ENTMCNC: 32.5 GM/DL
MCV RBC AUTO: 97.3 FL
MONOCYTES # BLD AUTO: 0.54 K/UL
MONOCYTES NFR BLD AUTO: 6 %
NEUTROPHILS # BLD AUTO: 6.49 K/UL
NEUTROPHILS NFR BLD AUTO: 71.9 %
NONHDLC SERPL-MCNC: 147 MG/DL
PLATELET # BLD AUTO: 260 K/UL
POTASSIUM SERPL-SCNC: 4.2 MMOL/L
PROT SERPL-MCNC: 7.2 G/DL
RBC # BLD: 4.77 M/UL
RBC # FLD: 13.5 %
SODIUM SERPL-SCNC: 141 MMOL/L
TRIGL SERPL-MCNC: 73 MG/DL
TSH SERPL-ACNC: 3.25 UIU/ML
WBC # FLD AUTO: 9.02 K/UL

## 2024-09-16 ENCOUNTER — TRANSCRIPTION ENCOUNTER (OUTPATIENT)
Age: 74
End: 2024-09-16

## 2024-09-20 ENCOUNTER — TRANSCRIPTION ENCOUNTER (OUTPATIENT)
Age: 74
End: 2024-09-20

## 2024-10-17 PROBLEM — H25.13 CATARACT ; BOTH EYES: Status: ACTIVE | Noted: 2024-10-17

## 2024-11-08 ENCOUNTER — APPOINTMENT (OUTPATIENT)
Dept: OBGYN | Facility: CLINIC | Age: 74
End: 2024-11-08
Payer: MEDICARE

## 2024-11-08 VITALS
SYSTOLIC BLOOD PRESSURE: 150 MMHG | DIASTOLIC BLOOD PRESSURE: 90 MMHG | HEIGHT: 61 IN | WEIGHT: 133 LBS | BODY MASS INDEX: 25.11 KG/M2

## 2024-11-08 VITALS — SYSTOLIC BLOOD PRESSURE: 142 MMHG | DIASTOLIC BLOOD PRESSURE: 80 MMHG

## 2024-11-08 DIAGNOSIS — Z01.419 ENCOUNTER FOR GYNECOLOGICAL EXAMINATION (GENERAL) (ROUTINE) W/OUT ABNORMAL FINDINGS: ICD-10-CM

## 2024-11-08 PROCEDURE — G0101: CPT

## 2024-11-08 RX ORDER — GLUCOSAMINE/MSM/CHONDROIT SULF 500-166.6
TABLET ORAL
Refills: 0 | Status: ACTIVE | COMMUNITY

## 2024-11-08 RX ORDER — PRASTERONE (DHEA) 25 MG
25 CAPSULE ORAL
Refills: 0 | Status: ACTIVE | COMMUNITY

## 2024-12-13 ENCOUNTER — OFFICE (OUTPATIENT)
Dept: URBAN - METROPOLITAN AREA CLINIC 12 | Facility: CLINIC | Age: 74
Setting detail: OPHTHALMOLOGY
End: 2024-12-13
Payer: MEDICARE

## 2024-12-13 DIAGNOSIS — H35.371: ICD-10-CM

## 2024-12-13 DIAGNOSIS — H16.223: ICD-10-CM

## 2024-12-13 DIAGNOSIS — H43.391: ICD-10-CM

## 2024-12-13 DIAGNOSIS — H43.813: ICD-10-CM

## 2024-12-13 DIAGNOSIS — H43.392: ICD-10-CM

## 2024-12-13 DIAGNOSIS — H35.3132: ICD-10-CM

## 2024-12-13 DIAGNOSIS — H35.411: ICD-10-CM

## 2024-12-13 DIAGNOSIS — H02.403: ICD-10-CM

## 2024-12-13 DIAGNOSIS — D31.32: ICD-10-CM

## 2024-12-13 PROCEDURE — 92134 CPTRZ OPH DX IMG PST SGM RTA: CPT | Performed by: OPHTHALMOLOGY

## 2024-12-13 PROCEDURE — 92014 COMPRE OPH EXAM EST PT 1/>: CPT | Performed by: OPHTHALMOLOGY

## 2024-12-13 ASSESSMENT — REFRACTION_CURRENTRX
OD_OVR_VA: 20/
OD_SPHERE: -2.75
OD_CYLINDER: -4.50
OD_AXIS: 168
OD_ADD: +3.00
OS_AXIS: 005
OS_OVR_VA: 20/
OD_VPRISM_DIRECTION: PROGS
OS_CYLINDER: -3.50
OS_SPHERE: -2.50
OS_VPRISM_DIRECTION: PROGS
OS_ADD: +3.00

## 2024-12-13 ASSESSMENT — REFRACTION_MANIFEST
OD_AXIS: 180
OS_CYLINDER: -4.25
OS_SPHERE: -2.50
OD_CYLINDER: -4.50
OD_SPHERE: -2.25
OS_AXIS: 180

## 2024-12-13 ASSESSMENT — LID EXAM ASSESSMENTS
OD_LEVATOR_FUNCTION: 20 MM
OD_BLEPHARITIS: 2+
OD_FRONTALIS_USE: 1+
OS_LEVATOR_FUNCTION: 20 MM
OD_MRD1: 1.5 MM
OS_MRD1: 1.5 MM
OS_BLEPHARITIS: 2+

## 2024-12-13 ASSESSMENT — REFRACTION_AUTOREFRACTION
OS_AXIS: 087
OS_SPHERE: +0.25
OD_AXIS: 038
OD_SPHERE: +0.25
OS_CYLINDER: -1.00
OD_CYLINDER: -1.75

## 2024-12-13 ASSESSMENT — LID POSITION - PTOSIS
OD_PTOSIS: RUL
OS_PTOSIS: LUL

## 2024-12-13 ASSESSMENT — KERATOMETRY
OD_K2POWER_DIOPTERS: 48.25
OS_K1POWER_DIOPTERS: 44.00
OD_AXISANGLE_DEGREES: 086
OS_AXISANGLE_DEGREES: 088
METHOD_AUTO_MANUAL: AUTO
OD_K1POWER_DIOPTERS: 44.00
OS_K2POWER_DIOPTERS: 47.25

## 2024-12-13 ASSESSMENT — LID POSITION - DERMATOCHALASIS
OD_DERMATOCHALASIS: T
OS_DERMATOCHALASIS: T

## 2024-12-13 ASSESSMENT — CONFRONTATIONAL VISUAL FIELD TEST (CVF)
OS_FINDINGS: FULL
OD_FINDINGS: FULL

## 2024-12-13 ASSESSMENT — VISUAL ACUITY
OS_BCVA: 20/30-2
OD_BCVA: 20/30-2

## 2024-12-13 ASSESSMENT — SUPERFICIAL PUNCTATE KERATITIS (SPK)
OD_SPK: 1+ 2+
OS_SPK: 1+

## 2024-12-13 ASSESSMENT — TONOMETRY
OS_IOP_MMHG: 15
OD_IOP_MMHG: 12

## 2025-04-17 PROBLEM — H25.13 CATARACT ; BOTH EYES: Status: ACTIVE | Noted: 2025-04-17

## 2025-08-18 PROBLEM — H01.014 BLEPHARITIS; RIGHT UPPER LID, LEFT UPPER LID: Status: ACTIVE | Noted: 2025-08-18

## 2025-08-18 PROBLEM — H01.011 BLEPHARITIS; RIGHT UPPER LID, LEFT UPPER LID: Status: ACTIVE | Noted: 2025-08-18

## 2025-08-19 ENCOUNTER — APPOINTMENT (OUTPATIENT)
Dept: DERMATOLOGY | Facility: CLINIC | Age: 75
End: 2025-08-19

## 2025-09-25 PROBLEM — E78.00 HYPERCHOLESTEROLEMIA: Status: ACTIVE | Noted: 2025-09-20

## 2025-09-25 PROBLEM — R73.01 IMPAIRED FASTING GLUCOSE: Status: ACTIVE | Noted: 2025-09-20

## 2025-09-26 PROBLEM — Z11.59 SCREENING FOR VIRAL DISEASE: Status: ACTIVE | Noted: 2025-09-26

## 2025-09-28 LAB
ALBUMIN SERPL ELPH-MCNC: 4.7 G/DL
ALP BLD-CCNC: 87 U/L
ALT SERPL-CCNC: 25 U/L
ANION GAP SERPL CALC-SCNC: 14 MMOL/L
AST SERPL-CCNC: 23 U/L
BASOPHILS # BLD AUTO: 0.06 K/UL
BASOPHILS NFR BLD AUTO: 0.6 %
BILIRUB SERPL-MCNC: 0.6 MG/DL
BUN SERPL-MCNC: 14 MG/DL
CALCIUM SERPL-MCNC: 10 MG/DL
CHLORIDE SERPL-SCNC: 106 MMOL/L
CHOLEST SERPL-MCNC: 227 MG/DL
CO2 SERPL-SCNC: 24 MMOL/L
CREAT SERPL-MCNC: 1.03 MG/DL
EGFRCR SERPLBLD CKD-EPI 2021: 57 ML/MIN/1.73M2
EOSINOPHIL # BLD AUTO: 0.1 K/UL
EOSINOPHIL NFR BLD AUTO: 1.1 %
ESTIMATED AVERAGE GLUCOSE: 117 MG/DL
GLUCOSE SERPL-MCNC: 104 MG/DL
HBA1C MFR BLD HPLC: 5.7 %
HCT VFR BLD CALC: 45.7 %
HDLC SERPL-MCNC: 63 MG/DL
HGB BLD-MCNC: 14.6 G/DL
IMM GRANULOCYTES NFR BLD AUTO: 0.2 %
LDLC SERPL-MCNC: 148 MG/DL
LYMPHOCYTES # BLD AUTO: 2.14 K/UL
LYMPHOCYTES NFR BLD AUTO: 22.5 %
MAN DIFF?: NORMAL
MCHC RBC-ENTMCNC: 31.9 G/DL
MCHC RBC-ENTMCNC: 31.9 PG
MCV RBC AUTO: 99.8 FL
MONOCYTES # BLD AUTO: 0.59 K/UL
MONOCYTES NFR BLD AUTO: 6.2 %
NEUTROPHILS # BLD AUTO: 6.61 K/UL
NEUTROPHILS NFR BLD AUTO: 69.4 %
NONHDLC SERPL-MCNC: 164 MG/DL
PLATELET # BLD AUTO: 275 K/UL
POTASSIUM SERPL-SCNC: 4.6 MMOL/L
PROT SERPL-MCNC: 7 G/DL
RBC # BLD: 4.58 M/UL
RBC # FLD: 13.9 %
SODIUM SERPL-SCNC: 144 MMOL/L
TRIGL SERPL-MCNC: 93 MG/DL
TSH SERPL-ACNC: 1.99 UIU/ML
WBC # FLD AUTO: 9.52 K/UL